# Patient Record
Sex: FEMALE | Race: BLACK OR AFRICAN AMERICAN | Employment: UNEMPLOYED | URBAN - METROPOLITAN AREA
[De-identification: names, ages, dates, MRNs, and addresses within clinical notes are randomized per-mention and may not be internally consistent; named-entity substitution may affect disease eponyms.]

---

## 2017-08-21 ENCOUNTER — OFFICE VISIT (OUTPATIENT)
Dept: FAMILY MEDICINE CLINIC | Age: 27
End: 2017-08-21
Payer: COMMERCIAL

## 2017-08-21 VITALS
SYSTOLIC BLOOD PRESSURE: 91 MMHG | DIASTOLIC BLOOD PRESSURE: 70 MMHG | TEMPERATURE: 97.3 F | HEART RATE: 91 BPM | BODY MASS INDEX: 19.84 KG/M2 | HEIGHT: 62 IN | WEIGHT: 107.8 LBS

## 2017-08-21 DIAGNOSIS — F79 MENTAL RETARDATION: ICD-10-CM

## 2017-08-21 DIAGNOSIS — J30.2 SEASONAL ALLERGIC RHINITIS, UNSPECIFIED ALLERGIC RHINITIS TRIGGER: ICD-10-CM

## 2017-08-21 DIAGNOSIS — F84.0 AUTISM: ICD-10-CM

## 2017-08-21 DIAGNOSIS — F90.1 ATTENTION-DEFICIT HYPERACTIVITY DISORDER, PREDOMINANTLY HYPERACTIVE TYPE: Primary | ICD-10-CM

## 2017-08-21 PROCEDURE — 99213 OFFICE O/P EST LOW 20 MIN: CPT | Performed by: FAMILY MEDICINE

## 2017-08-21 PROCEDURE — G8427 DOCREV CUR MEDS BY ELIG CLIN: HCPCS | Performed by: FAMILY MEDICINE

## 2017-08-21 PROCEDURE — 1036F TOBACCO NON-USER: CPT | Performed by: FAMILY MEDICINE

## 2017-08-21 PROCEDURE — G8420 CALC BMI NORM PARAMETERS: HCPCS | Performed by: FAMILY MEDICINE

## 2017-08-21 ASSESSMENT — ENCOUNTER SYMPTOMS
RESPIRATORY NEGATIVE: 1
ALLERGIC/IMMUNOLOGIC NEGATIVE: 1
EYES NEGATIVE: 1

## 2017-09-21 ENCOUNTER — TELEPHONE (OUTPATIENT)
Dept: FAMILY MEDICINE CLINIC | Age: 27
End: 2017-09-21

## 2017-09-27 ENCOUNTER — TELEPHONE (OUTPATIENT)
Dept: FAMILY MEDICINE CLINIC | Age: 27
End: 2017-09-27

## 2017-12-13 ENCOUNTER — TELEPHONE (OUTPATIENT)
Dept: FAMILY MEDICINE CLINIC | Age: 27
End: 2017-12-13

## 2017-12-13 NOTE — TELEPHONE ENCOUNTER
We received an envelope for pt from [facility]. Per  pt needs to come in for an appt before he can fill out papers. I called number in pt chart and pt father because he filled forms out but i got voicemail so I told them that they needed to call the office to schedule an appt with us in order to have forms filled out. Envelope will be in brown folder.

## 2017-12-27 ENCOUNTER — OFFICE VISIT (OUTPATIENT)
Dept: FAMILY MEDICINE CLINIC | Age: 27
End: 2017-12-27
Payer: COMMERCIAL

## 2017-12-27 VITALS
SYSTOLIC BLOOD PRESSURE: 102 MMHG | HEIGHT: 62 IN | WEIGHT: 113 LBS | HEART RATE: 90 BPM | DIASTOLIC BLOOD PRESSURE: 78 MMHG | TEMPERATURE: 97.1 F | BODY MASS INDEX: 20.8 KG/M2

## 2017-12-27 DIAGNOSIS — F90.1 ATTENTION-DEFICIT HYPERACTIVITY DISORDER, PREDOMINANTLY HYPERACTIVE TYPE: ICD-10-CM

## 2017-12-27 DIAGNOSIS — Z00.00 ANNUAL PHYSICAL EXAM: Primary | ICD-10-CM

## 2017-12-27 DIAGNOSIS — F84.0 AUTISM: ICD-10-CM

## 2017-12-27 DIAGNOSIS — F72 MR (MENTAL RETARDATION), SEVERE: ICD-10-CM

## 2017-12-27 DIAGNOSIS — H61.23 BILATERAL IMPACTED CERUMEN: ICD-10-CM

## 2017-12-27 PROCEDURE — 99395 PREV VISIT EST AGE 18-39: CPT | Performed by: FAMILY MEDICINE

## 2017-12-27 ASSESSMENT — ENCOUNTER SYMPTOMS
SHORTNESS OF BREATH: 0
ABDOMINAL PAIN: 0

## 2017-12-27 NOTE — PROGRESS NOTES
Visit Information    Have you changed or started any medications since your last visit including any over-the-counter medicines, vitamins, or herbal medicines? no   Have you stopped taking any of your medications? Is so, why? -  no  Are you having any side effects from any of your medications? - no    Have you seen any other physician or provider since your last visit?  no   Have you had any other diagnostic tests since your last visit?  no   Have you been seen in the emergency room and/or had an admission in a hospital since we last saw you?  no   Have you had your routine dental cleaning in the past 6 months?  no     Do you have an active MyChart account? If no, what is the barrier? Yes    Patient Care Team:  Lazaro Swenson MD as PCP - General (Family Medicine)    Medical History Review  Past Medical, Family, and Social History reviewed and does contribute to the patient presenting condition    Health Maintenance   Topic Date Due    DTaP/Tdap/Td vaccine (1 - Tdap) 12/27/2010    Flu vaccine (1) 08/21/2018 (Originally 9/1/2017)    Cervical cancer screen  08/21/2020 (Originally 12/27/2012)    HIV screen  08/21/2020 (Originally 12/27/2006)       . lab

## 2017-12-27 NOTE — PROGRESS NOTES
Subjective:      Patient ID: Lisset Rawls is a 33 y.o. female. HPI  Patient here with parents for a physical exam.  Due to her medical diagnoses as listed below, patient requires assistance with her daily activities. Parents deny any recent changes or new concerns. ROS responses by parents due to patient's MR  Review of Systems   Constitutional: Negative for chills and fever. HENT: Negative for congestion. Eyes: Negative for visual disturbance. Respiratory: Negative for shortness of breath. Cardiovascular: Negative for chest pain. Gastrointestinal: Negative for abdominal pain. Neurological: Negative for headaches. Objective:   Physical Exam   Constitutional: She appears well-nourished. HENT:   Head: Normocephalic and atraumatic. B/L cerumen impaction   Eyes: EOM are normal.   Neck: Neck supple. Cardiovascular: Normal rate, regular rhythm and normal heart sounds. Pulmonary/Chest: Effort normal and breath sounds normal. No respiratory distress. She has no wheezes. Abdominal: Soft. Bowel sounds are normal. She exhibits no distension. There is no tenderness. Musculoskeletal: She exhibits no edema or tenderness. Assessment:      1. Annual physical exam    2. MR (mental retardation), severe    3. Autism    4. Attention-deficit hyperactivity disorder, predominantly hyperactive type    5. Bilateral impacted cerumen          Plan:      1. Annual physical exam     2. MR (mental retardation), severe  stable   3. Autism  stable   4. Attention-deficit hyperactivity disorder, predominantly hyperactive type  Stable, presently off medications   5. Bilateral impacted cerumen  carbamide peroxide (DEBROX) 6.5 % otic solution     Forms completed - see media.

## 2018-01-12 ENCOUNTER — TELEPHONE (OUTPATIENT)
Dept: FAMILY MEDICINE CLINIC | Age: 28
End: 2018-01-12

## 2018-08-06 ENCOUNTER — OFFICE VISIT (OUTPATIENT)
Dept: FAMILY MEDICINE CLINIC | Age: 28
End: 2018-08-06
Payer: COMMERCIAL

## 2018-08-06 VITALS
HEIGHT: 62 IN | DIASTOLIC BLOOD PRESSURE: 70 MMHG | WEIGHT: 129.6 LBS | SYSTOLIC BLOOD PRESSURE: 99 MMHG | BODY MASS INDEX: 23.85 KG/M2 | TEMPERATURE: 98.7 F | HEART RATE: 101 BPM

## 2018-08-06 DIAGNOSIS — G40.109 PARTIAL EPILEPSY (HCC): ICD-10-CM

## 2018-08-06 DIAGNOSIS — Z00.00 ANNUAL PHYSICAL EXAM: Primary | ICD-10-CM

## 2018-08-06 DIAGNOSIS — F79 MENTAL RETARDATION: ICD-10-CM

## 2018-08-06 DIAGNOSIS — F90.1 ATTENTION-DEFICIT HYPERACTIVITY DISORDER, PREDOMINANTLY HYPERACTIVE TYPE: ICD-10-CM

## 2018-08-06 PROCEDURE — 99395 PREV VISIT EST AGE 18-39: CPT | Performed by: FAMILY MEDICINE

## 2018-08-06 PROCEDURE — 99213 OFFICE O/P EST LOW 20 MIN: CPT | Performed by: FAMILY MEDICINE

## 2018-08-06 ASSESSMENT — PATIENT HEALTH QUESTIONNAIRE - PHQ9
SUM OF ALL RESPONSES TO PHQ9 QUESTIONS 1 & 2: 0
SUM OF ALL RESPONSES TO PHQ QUESTIONS 1-9: 0
1. LITTLE INTEREST OR PLEASURE IN DOING THINGS: 0
2. FEELING DOWN, DEPRESSED OR HOPELESS: 0

## 2018-08-06 ASSESSMENT — ENCOUNTER SYMPTOMS
RESPIRATORY NEGATIVE: 1
EYES NEGATIVE: 1
GASTROINTESTINAL NEGATIVE: 1
ALLERGIC/IMMUNOLOGIC NEGATIVE: 1

## 2018-08-06 NOTE — PROGRESS NOTES
Subjective:      Patient ID: Miguel Wilalrd is a 33 y.o. female. here for an annual physical exam.has a prior history of ADHD/mental retardation but are not on any medication. HPI    Review of Systems   Constitutional: Negative. HENT: Negative. Eyes: Negative. Respiratory: Negative. Cardiovascular: Negative. Gastrointestinal: Negative. Endocrine: Negative. Genitourinary: Negative. Allergic/Immunologic: Negative. Neurological: Negative. Hematological: Negative. Psychiatric/Behavioral: Negative. Objective:   Physical Exam   Constitutional: She appears well-developed and well-nourished. No distress. Cardiovascular: Normal rate, regular rhythm, normal heart sounds and intact distal pulses. Exam reveals no gallop and no friction rub. No murmur heard. Pulmonary/Chest: Effort normal and breath sounds normal. No respiratory distress. She has no wheezes. She has no rales. She exhibits no tenderness. Abdominal: Soft. Bowel sounds are normal. She exhibits no distension and no mass. There is no tenderness. There is no rebound and no guarding. Neurological: She is alert. Skin: She is not diaphoretic. Nursing note and vitals reviewed. Assessment:    annual physical exam.  ADHd. History of seizure disorder. .        Plan:    have mental retardation and ADHD and seizure disorder but are not on any medication and stable.   Not sexually active and foster mom does not want Pap test.

## 2018-08-06 NOTE — PROGRESS NOTES
Visit Information    Have you changed or started any medications since your last visit including any over-the-counter medicines, vitamins, or herbal medicines? no   Have you stopped taking any of your medications? Is so, why? -  no  Are you having any side effects from any of your medications? - no    Have you seen any other physician or provider since your last visit?  no   Have you had any other diagnostic tests since your last visit?  no   Have you been seen in the emergency room and/or had an admission in a hospital since we last saw you?  no   Have you had your routine dental cleaning in the past 6 months?  no     Do you have an active MyChart account? If no, what is the barrier?   No: declined    Patient Care Team:  Balbina Vides MD as PCP - General (Family Medicine)    Medical History Review  Past Medical, Family, and Social History reviewed and does not contribute to the patient presenting condition    Health Maintenance   Topic Date Due    DTaP/Tdap/Td vaccine (1 - Tdap) 12/27/2010    Cervical cancer screen  08/21/2020 (Originally 12/27/2012)    HIV screen  08/21/2020 (Originally 12/27/2006)    Flu vaccine (1) 09/01/2018

## 2018-08-17 ENCOUNTER — TELEPHONE (OUTPATIENT)
Dept: ADMINISTRATIVE | Age: 28
End: 2018-08-17

## 2018-09-25 ENCOUNTER — OFFICE VISIT (OUTPATIENT)
Dept: FAMILY MEDICINE CLINIC | Age: 28
End: 2018-09-25
Payer: COMMERCIAL

## 2018-09-25 VITALS
WEIGHT: 128 LBS | HEART RATE: 104 BPM | HEIGHT: 62 IN | BODY MASS INDEX: 23.55 KG/M2 | TEMPERATURE: 97.9 F | DIASTOLIC BLOOD PRESSURE: 72 MMHG | SYSTOLIC BLOOD PRESSURE: 109 MMHG

## 2018-09-25 DIAGNOSIS — B86 SCABIES: Primary | ICD-10-CM

## 2018-09-25 PROCEDURE — 1036F TOBACCO NON-USER: CPT | Performed by: STUDENT IN AN ORGANIZED HEALTH CARE EDUCATION/TRAINING PROGRAM

## 2018-09-25 PROCEDURE — 99213 OFFICE O/P EST LOW 20 MIN: CPT | Performed by: STUDENT IN AN ORGANIZED HEALTH CARE EDUCATION/TRAINING PROGRAM

## 2018-09-25 PROCEDURE — G8420 CALC BMI NORM PARAMETERS: HCPCS | Performed by: STUDENT IN AN ORGANIZED HEALTH CARE EDUCATION/TRAINING PROGRAM

## 2018-09-25 PROCEDURE — G8427 DOCREV CUR MEDS BY ELIG CLIN: HCPCS | Performed by: STUDENT IN AN ORGANIZED HEALTH CARE EDUCATION/TRAINING PROGRAM

## 2018-09-25 RX ORDER — PERMETHRIN 50 MG/G
CREAM TOPICAL
Qty: 1 TUBE | Refills: 0 | Status: SHIPPED | OUTPATIENT
Start: 2018-09-25 | End: 2018-10-22 | Stop reason: SDUPTHER

## 2018-09-25 ASSESSMENT — ENCOUNTER SYMPTOMS
GASTROINTESTINAL NEGATIVE: 1
EYES NEGATIVE: 1
RESPIRATORY NEGATIVE: 1

## 2018-09-25 NOTE — PROGRESS NOTES
2018     Darell York (:  1990) is a 33 y.o. female, here for evaluation of the following medical concerns:    HPI  {Visit Chronic CHP (Optional):27550}    Review of Systems    Prior to Visit Medications    Medication Sig Taking? Authorizing Provider   permethrin (ELIMITE) 5 % cream Massage on to skin from head to toe, avoid eyes and mouth. Leave on for 12 hours and then wash off. Yes Rl Escobar MD   diphenhydrAMINE-zinc acetate (BENADRYL ITCH STOPPING) 1-0.1 % cream Apply topically 3 times daily as needed. Yes Rl Escobar MD   Loratadine 10 MG CAPS Take 10 mg by mouth daily Yes Kiki Masters MD   methylphenidate (RITALIN) 10 MG tablet Take 1 tablet by mouth 4 times daily  Kiki Masters MD   levETIRAcetam (KEPPRA) 250 MG tablet Take 1 tablet in the morning and 2 tablets at night. Kiki Masters MD        Social History   Substance Use Topics    Smoking status: Never Smoker    Smokeless tobacco: Never Used    Alcohol use No        Vitals:    18 0850   BP: 109/72   Site: Right Upper Arm   Position: Sitting   Cuff Size: Medium Adult   Pulse: 104   Temp: 97.9 °F (36.6 °C)   TempSrc: Temporal   Weight: 128 lb (58.1 kg)   Height: 5' 2.01\" (1.575 m)     Estimated body mass index is 23.41 kg/m² as calculated from the following:    Height as of this encounter: 5' 2.01\" (1.575 m). Weight as of this encounter: 128 lb (58.1 kg). Physical Exam    ASSESSMENT/PLAN:  1. Scabies    - permethrin (ELIMITE) 5 % cream; Massage on to skin from head to toe, avoid eyes and mouth. Leave on for 12 hours and then wash off. Dispense: 1 Tube; Refill: 0  - diphenhydrAMINE-zinc acetate (BENADRYL ITCH STOPPING) 1-0.1 % cream; Apply topically 3 times daily as needed. Dispense: 1 Tube; Refill: 0      Return in about 4 weeks (around 10/23/2018) for rash follow-up . An electronic signature was used to authenticate this note.     --Brandi Acosta MD on 2018 at 2:19 PM

## 2018-09-25 NOTE — PROGRESS NOTES
stages of healing         Lab Results   Component Value Date    WBC 5.7 05/18/2012    HGB 13.1 05/18/2012    HCT 40.1 05/18/2012     05/18/2012    ALT 13 05/18/2012    AST 22 05/18/2012     05/18/2012    K 3.5 05/18/2012     05/18/2012    CREATININE 0.56 05/18/2012    BUN 12 05/18/2012    CO2 27 05/18/2012     Lab Results   Component Value Date    CALCIUM 9.3 05/18/2012     No results found for: LDLCALC, LDLCHOLESTEROL, LDLDIRECT    Assessment and Plan:    1. Scabies  - permethrin (ELIMITE) 5 % cream; Massage on to skin from head to toe, avoid eyes and mouth. Leave on for 12 hours and then wash off. Dispense: 1 Tube; Refill: 0  - diphenhydrAMINE-zinc acetate (BENADRYL ITCH STOPPING) 1-0.1 % cream; Apply topically 3 times daily as needed. Dispense: 1 Tube; Refill: 0    2. Need for Vaccination  - mother refused influenza  - will return for Tdap          Requested Prescriptions     Signed Prescriptions Disp Refills    permethrin (ELIMITE) 5 % cream 1 Tube 0     Sig: Massage on to skin from head to toe, avoid eyes and mouth. Leave on for 12 hours and then wash off.  diphenhydrAMINE-zinc acetate (BENADRYL ITCH STOPPING) 1-0.1 % cream 1 Tube 0     Sig: Apply topically 3 times daily as needed. There are no discontinued medications. No Follow-up on file. Guerita received counseling on the following healthy behaviors: medication adherence  Reviewed prior labs and health maintenance  Continue current medications, diet and exercise. Discussed use, benefit, and side effects of prescribed medications. Barriers to medication compliance addressed. Patient given educational materials - see patient instructions  Was a self-tracking handout given in paper form or via [employer]? No    Requested Prescriptions     Signed Prescriptions Disp Refills    permethrin (ELIMITE) 5 % cream 1 Tube 0     Sig: Massage on to skin from head to toe, avoid eyes and mouth.  Leave on for 12 hours and then wash off.    diphenhydrAMINE-zinc acetate (BENADRYL ITCH STOPPING) 1-0.1 % cream 1 Tube 0     Sig: Apply topically 3 times daily as needed. All patient questions answered. Patient voiced understanding. Quality Measures    Body mass index is 23.41 kg/m². Normal. Weight control planned discussed Healthy diet and regular exercise. BP: 109/72 Blood pressure is normal. Treatment plan consists of No treatment change needed.     No results found for: LDLCALC, LDLCHOLESTEROL, LDLDIRECT (goal LDL reduction with dx if diabetes is 50% LDL reduction)      PHQ Scores 8/6/2018   PHQ2 Score 0   PHQ9 Score 0     Interpretation of Total Score Depression Severity: 1-4 = Minimal depression, 5-9 = Mild depression, 10-14 = Moderate depression, 15-19 = Moderately severe depression, 20-27 = Severe depression

## 2018-10-08 ENCOUNTER — TELEPHONE (OUTPATIENT)
Dept: FAMILY MEDICINE CLINIC | Age: 28
End: 2018-10-08

## 2018-10-10 ENCOUNTER — TELEPHONE (OUTPATIENT)
Dept: FAMILY MEDICINE CLINIC | Age: 28
End: 2018-10-10

## 2018-10-10 NOTE — TELEPHONE ENCOUNTER
The mother of Mark Silver called stating that the medication for her daughter is not working for her rash on her arm and leg, can you call in new medication to her pharmacy because the mother do not want to bring the girls back in office.

## 2018-10-22 ENCOUNTER — OFFICE VISIT (OUTPATIENT)
Dept: FAMILY MEDICINE CLINIC | Age: 28
End: 2018-10-22
Payer: COMMERCIAL

## 2018-10-22 VITALS
SYSTOLIC BLOOD PRESSURE: 120 MMHG | DIASTOLIC BLOOD PRESSURE: 70 MMHG | HEIGHT: 62 IN | BODY MASS INDEX: 24.4 KG/M2 | WEIGHT: 132.6 LBS

## 2018-10-22 DIAGNOSIS — B86 SCABIES: ICD-10-CM

## 2018-10-22 PROCEDURE — G8420 CALC BMI NORM PARAMETERS: HCPCS | Performed by: STUDENT IN AN ORGANIZED HEALTH CARE EDUCATION/TRAINING PROGRAM

## 2018-10-22 PROCEDURE — 99213 OFFICE O/P EST LOW 20 MIN: CPT | Performed by: STUDENT IN AN ORGANIZED HEALTH CARE EDUCATION/TRAINING PROGRAM

## 2018-10-22 PROCEDURE — G8427 DOCREV CUR MEDS BY ELIG CLIN: HCPCS | Performed by: STUDENT IN AN ORGANIZED HEALTH CARE EDUCATION/TRAINING PROGRAM

## 2018-10-22 PROCEDURE — G8484 FLU IMMUNIZE NO ADMIN: HCPCS | Performed by: STUDENT IN AN ORGANIZED HEALTH CARE EDUCATION/TRAINING PROGRAM

## 2018-10-22 PROCEDURE — 1036F TOBACCO NON-USER: CPT | Performed by: STUDENT IN AN ORGANIZED HEALTH CARE EDUCATION/TRAINING PROGRAM

## 2018-10-22 RX ORDER — PERMETHRIN 50 MG/G
CREAM TOPICAL
Qty: 1 TUBE | Refills: 1 | Status: SHIPPED | OUTPATIENT
Start: 2018-10-22 | End: 2018-11-27 | Stop reason: SDUPTHER

## 2018-10-22 RX ORDER — TRIAMCINOLONE ACETONIDE 0.25 MG/G
OINTMENT TOPICAL
Qty: 1 TUBE | Refills: 1 | Status: SHIPPED | OUTPATIENT
Start: 2018-10-22 | End: 2018-10-29

## 2018-10-22 ASSESSMENT — ENCOUNTER SYMPTOMS
ROS SKIN COMMENTS: PRURITIS
NAUSEA: 0
SHORTNESS OF BREATH: 0

## 2018-10-22 NOTE — PATIENT INSTRUCTIONS
Thank you for letting us take care of you today. We hope all your questions were addressed. If a question was overlooked or something else comes to mind after you return home, please contact a member of your Care Team listed below. Please make sure you have a routine office visit set up to follow-up on [address]. Your Care Team at [place] [address] is Team #3  Barton Lesches, MD (Faculty)  Rach Barry MD (Faculty  Dub MD Teodoro (Resident)  Marylen Blunt, MD (Resident)   Tristen Lim MD (Resident)  Suyapa Dixon MD (Resident)  Radha Horne MD (Resident)  Ning Sanders LPN  [facility] [address] [facility] [place] Chikis Wilder ([facility])  Alexandra Baxter RN, ([address] [facility])  Shaquille Buck, Ph.D., (Behavioral Services)  [address] (Clinical Pharmacist)     Office phone number: [phone]    If you need to get in right away due to illness, please be advised we have \"Same Day\" appointments available Monday-Friday. Please call us at [phone] option #3 to schedule your \"Same Day\" appointment.

## 2018-10-22 NOTE — PROGRESS NOTES
Subjective:      Karly Mathew is a 33 y.o. female with Hx of  has a past medical history of ADHD (attention deficit hyperactivity disorder); Autism; and Seizure ([address] HPI    Ms. Chano Devlin is a 34 yo female presenting to the office with caregiver and sister for rash throughout body. Patient was seen about a month ago for similar complaints. Patient was prescribed Permethrin cream without relief. Caregivers states rash has persisted but not worsening. States she does itch. Per caregiver, patient attends [address] [place] [address] and may have been exposed to bed bugs or other infection. Review of Systems   Constitutional: Negative for activity change and fever. Respiratory: Negative for shortness of breath. Gastrointestinal: Negative for nausea. Skin: Positive for rash. pruritis    Neurological: Negative for headaches. No family history on file. Social History     Social History    Marital status: Single     Spouse name: N/A    Number of children: N/A    Years of education: N/A     Occupational History    Not on file. Social History Main Topics    Smoking status: Never Smoker    Smokeless tobacco: Never Used    Alcohol use No    Drug use: No    Sexual activity: No     Other Topics Concern    Not on file     Social History Narrative    No narrative on file       Objective:      /70 (Site: Left Upper Arm, Position: Sitting, Cuff Size: Medium Adult) Comment: manual  Ht 5' 2.01\" (1.575 m)   Wt 132 lb 9.6 oz (60.1 kg)   BMI 24.25 kg/m²    BP Readings from Last 3 Encounters:   10/22/18 120/70   09/25/18 109/72   08/06/18 99/70       Physical Exam    General Appearance:  A&Ox3, NAD  Lungs:  Clear to auscultation B/L. Cardiovascular:  NL S1/S2, RRR, no m,g,r. Abdomen: + BS, Soft, nontender, nondistended, no masses or organomegaly  Skin: multiple erythematous lesions on body   Extremities: No cyanosis, clubbing or edema    Assessment:     1.  Scabies        Plan:

## 2018-11-26 ENCOUNTER — TELEPHONE (OUTPATIENT)
Dept: ADMINISTRATIVE | Age: 28
End: 2018-11-26

## 2018-11-26 NOTE — TELEPHONE ENCOUNTER
pts mother is calling needing a refill on the rash medication cream, any questions call pt mother at phone number [phone]

## 2018-11-27 DIAGNOSIS — L23.9 ALLERGIC DERMATITIS: Primary | ICD-10-CM

## 2018-11-27 DIAGNOSIS — B86 SCABIES: ICD-10-CM

## 2018-11-27 RX ORDER — PERMETHRIN 50 MG/G
CREAM TOPICAL
Qty: 1 TUBE | Refills: 1 | Status: SHIPPED | OUTPATIENT
Start: 2018-11-27 | End: 2019-10-09

## 2018-11-27 RX ORDER — TRIAMCINOLONE ACETONIDE 0.25 MG/G
OINTMENT TOPICAL
Qty: 1 TUBE | Refills: 1 | Status: CANCELLED | OUTPATIENT
Start: 2018-11-27 | End: 2018-12-04

## 2018-11-27 RX ORDER — PERMETHRIN 50 MG/G
CREAM TOPICAL
Qty: 1 TUBE | Refills: 1 | Status: CANCELLED | OUTPATIENT
Start: 2018-11-27

## 2019-03-01 ENCOUNTER — OFFICE VISIT (OUTPATIENT)
Dept: FAMILY MEDICINE CLINIC | Age: 29
End: 2019-03-01
Payer: COMMERCIAL

## 2019-03-01 VITALS
DIASTOLIC BLOOD PRESSURE: 83 MMHG | SYSTOLIC BLOOD PRESSURE: 125 MMHG | TEMPERATURE: 97.5 F | BODY MASS INDEX: 26.87 KG/M2 | HEIGHT: 62 IN | WEIGHT: 146 LBS
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
## 2019-03-01 PROCEDURE — 99213 OFFICE O/P EST LOW 20 MIN: CPT | Performed by: STUDENT IN AN ORGANIZED HEALTH CARE EDUCATION/TRAINING PROGRAM

## 2019-03-01 PROCEDURE — 90715 TDAP VACCINE 7 YRS/> IM: CPT | Performed by: FAMILY MEDICINE

## 2019-03-01 PROCEDURE — 99211 OFF/OP EST MAY X REQ PHY/QHP: CPT | Performed by: STUDENT IN AN ORGANIZED HEALTH CARE EDUCATION/TRAINING PROGRAM

## 2019-03-01 RX ORDER — TRIAMCINOLONE ACETONIDE 0.25 MG/G
CREAM TOPICAL
Qty: 1 TUBE | Refills: 3 | Status: SHIPPED | OUTPATIENT
Start: 2019-03-01 | End: 2019-10-09

## 2019-03-01 ASSESSMENT — PATIENT HEALTH QUESTIONNAIRE - PHQ9
SUM OF ALL RESPONSES TO PHQ9 QUESTIONS 1 & 2: 0
SUM OF ALL RESPONSES TO PHQ QUESTIONS 1-9: 0
2. FEELING DOWN, DEPRESSED OR HOPELESS: 0
SUM OF ALL RESPONSES TO PHQ QUESTIONS 1-9: 0
1. LITTLE INTEREST OR PLEASURE IN DOING THINGS: 0

## 2019-06-14 ENCOUNTER — TELEPHONE (OUTPATIENT)
Dept: FAMILY MEDICINE CLINIC | Age: 29
End: 2019-06-14

## 2019-06-17 DIAGNOSIS — R21 MACULOPAPULAR RASH, GENERALIZED: Primary | ICD-10-CM

## 2019-07-01 ENCOUNTER — TELEPHONE (OUTPATIENT)
Dept: FAMILY MEDICINE CLINIC | Age: 29
End: 2019-07-01

## 2019-07-12 ENCOUNTER — OFFICE VISIT (OUTPATIENT)
Dept: DERMATOLOGY | Age: 29
End: 2019-07-12
Payer: COMMERCIAL

## 2019-07-12 VITALS — BODY MASS INDEX: 29 KG/M2 | HEIGHT: 62 IN | WEIGHT: 157.6 LBS

## 2019-07-12 DIAGNOSIS — L30.8 OTHER SPECIFIED DERMATITIS: ICD-10-CM

## 2019-07-12 DIAGNOSIS — B86 SCABIES: Primary | ICD-10-CM

## 2019-07-12 DIAGNOSIS — J30.2 SEASONAL ALLERGIES: ICD-10-CM

## 2019-07-12 PROCEDURE — 99203 OFFICE O/P NEW LOW 30 MIN: CPT | Performed by: DERMATOLOGY

## 2019-07-12 RX ORDER — LORATADINE 10 MG/1
10 CAPSULE, LIQUID FILLED ORAL DAILY
Qty: 30 CAPSULE | Refills: 5 | Status: SHIPPED | OUTPATIENT
Start: 2019-07-12

## 2019-07-12 RX ORDER — IVERMECTIN 3 MG/1
200 TABLET ORAL ONCE
Qty: 5 TABLET | Refills: 0 | Status: SHIPPED | OUTPATIENT
Start: 2019-07-12 | End: 2019-07-12

## 2019-07-12 NOTE — PROGRESS NOTES
Dermatology Patient Note  [facility] DERMATOLOGY  [address] [facility]  [address] [place] [address]  Dept: [phone]  Dept Fax: [phone]      VISITDATE: 7/12/2019   REFERRING PROVIDER: Latasha Sharp MD      Maria Del Carmen Ramírez is a 33 y.o. female  who presents today in the office for:    New Patient (Rash all over the body and was treated for scabies with permethrin and triamcinolone)      HISTORY OF PRESENT ILLNESS:  33 y.o. female with h/o autism, MR, and ADHD presenting for bumps and itching  Location: hands and feet especially, but diffuse  Duration: on and off for nearly a year  Symptoms: itching  Course: Began in September for both her and her twin sister, treated for scabies but recurred, went back to doc in October and again in March. Both girls spend some of their time at respite care and mom thinks they may be getting reinfected there. They were recently sent home from school for repeat itching, especially Shavette. In addition to completing scabies treatment and environmental control, mom has also had an  come out for bed bugs and has \"bombed\" the house. Exacerbating factors: potentially exposure at adult respite care  Prior treatments: permethrin, triamcinolone 0.025% cream, claritin,  at home      CURRENT MEDICATIONS:   Current Outpatient Medications   Medication Sig Dispense Refill    fluocinonide (LIDEX) 0.05 % cream Apply topically 2 times daily. 60 g 2    loratadine (CLARITIN) 10 MG capsule Take 1 capsule by mouth daily 30 capsule 5    levETIRAcetam (KEPPRA) 250 MG tablet Take 1 tablet in the morning and 2 tablets at night. 90 tablet 5    triamcinolone (KENALOG) 0.025 % cream Apply topically 2 times daily. 1 Tube 3    permethrin (ELIMITE) 5 % cream Massage on to skin from head to toe, avoid eyes and mouth. Leave on for 12 hours and then wash off. 1 Tube 1     No current facility-administered medications for this visit.         ALLERGIES:   Allergies

## 2019-07-12 NOTE — PATIENT INSTRUCTIONS
- Ivermectin orally  - Apply fluocinonide cream twice daily for active itch (DO NOT APPLY TO FACE)  - Claritin every morning before school

## 2019-09-11 ENCOUNTER — TELEPHONE (OUTPATIENT)
Dept: FAMILY MEDICINE CLINIC | Age: 29
End: 2019-09-11

## 2019-10-09 ENCOUNTER — OFFICE VISIT (OUTPATIENT)
Dept: FAMILY MEDICINE CLINIC | Age: 29
End: 2019-10-09
Payer: COMMERCIAL

## 2019-10-09 VITALS
BODY MASS INDEX: 28.86 KG/M2 | SYSTOLIC BLOOD PRESSURE: 100 MMHG | HEART RATE: 70 BPM | WEIGHT: 157.8 LBS | DIASTOLIC BLOOD PRESSURE: 65 MMHG | TEMPERATURE: 98.2 F

## 2019-10-09 DIAGNOSIS — L30.8 OTHER SPECIFIED DERMATITIS: ICD-10-CM

## 2019-10-09 DIAGNOSIS — Z00.00 ANNUAL PHYSICAL EXAM: Primary | ICD-10-CM

## 2019-10-09 PROCEDURE — 99211 OFF/OP EST MAY X REQ PHY/QHP: CPT | Performed by: FAMILY MEDICINE

## 2019-10-09 PROCEDURE — 99395 PREV VISIT EST AGE 18-39: CPT | Performed by: FAMILY MEDICINE

## 2019-10-09 ASSESSMENT — ENCOUNTER SYMPTOMS
RESPIRATORY NEGATIVE: 1
EYES NEGATIVE: 1
ALLERGIC/IMMUNOLOGIC NEGATIVE: 1
GASTROINTESTINAL NEGATIVE: 1

## 2020-07-10 ENCOUNTER — TELEPHONE (OUTPATIENT)
Dept: FAMILY MEDICINE CLINIC | Age: 30
End: 2020-07-10

## 2020-07-10 NOTE — TELEPHONE ENCOUNTER
Brad Rios from [employer] called in wanting information on pt and left her number to get a call back [phone]

## 2023-04-06 ENCOUNTER — HOSPITAL ENCOUNTER (EMERGENCY)
Facility: CLINIC | Age: 33
Discharge: HOME | End: 2023-04-06
Attending: EMERGENCY MEDICINE
Payer: COMMERCIAL

## 2023-04-06 ENCOUNTER — APPOINTMENT (OUTPATIENT)
Dept: GENERAL RADIOLOGY | Facility: CLINIC | Age: 33
End: 2023-04-06
Payer: COMMERCIAL

## 2023-04-06 VITALS
TEMPERATURE: 98.3 F | RESPIRATION RATE: 16 BRPM | OXYGEN SATURATION: 100 % | SYSTOLIC BLOOD PRESSURE: 107 MMHG | DIASTOLIC BLOOD PRESSURE: 76 MMHG | HEIGHT: 62 IN | WEIGHT: 120 LBS | HEART RATE: 88 BPM | BODY MASS INDEX: 22.08 KG/M2

## 2023-04-06 DIAGNOSIS — S52.521A CLOSED METAPHYSEAL TORUS FRACTURE OF DISTAL END OF RIGHT RADIUS, INITIAL ENCOUNTER: Primary | ICD-10-CM

## 2023-04-06 PROCEDURE — 73110 X-RAY EXAM OF WRIST: CPT

## 2023-04-06 PROCEDURE — 6370000000 HC RX 637 (ALT 250 FOR IP): Performed by: PHYSICIAN ASSISTANT

## 2023-04-06 RX ORDER — ACETAMINOPHEN 160 MG/5ML
15 SUSPENSION ORAL EVERY 6 HOURS PRN
Qty: 240 ML | Refills: 0 | Status: SHIPPED | OUTPATIENT
Start: 2023-04-06

## 2023-04-06 RX ORDER — ACETAMINOPHEN 160 MG/5ML
15 SUSPENSION ORAL EVERY 6 HOURS PRN
Qty: 240 ML | Refills: 0 | Status: SHIPPED | OUTPATIENT
Start: 2023-04-06 | End: 2023-04-06 | Stop reason: SDUPTHER

## 2023-04-06 RX ADMIN — IBUPROFEN 600 MG: 100 SUSPENSION ORAL at 12:34

## 2023-04-06 ASSESSMENT — PAIN SCALES - GENERAL: PAINLEVEL_OUTOF10: 2

## 2023-04-06 NOTE — ED PROVIDER NOTES
[facility] ED  [address] [facility]  Phone: [phone]      Attending Physician [address]       Chief Complaint   Patient presents with    Wrist Pain       DIAGNOSTIC RESULTS     LABS:  Labs Reviewed - No data to display    All other labs were within normal range or not returned as of this dictation. RADIOLOGY:  XR WRIST RIGHT (MIN 3 VIEWS)    (Results Pending)         EMERGENCY DEPARTMENT COURSE:   Vitals:    Vitals:    04/06/23 1227   BP: 107/76   Pulse: 88   Resp: 16   Temp: 98.3 °F (36.8 °C)   TempSrc: Temporal   SpO2: 100%   Weight: 54.4 kg (120 lb)   Height: 5' 2\" (1.575 m)     -------------------------  BP: 107/76, Temp: 98.3 °F (36.8 °C), Heart Rate: 88, Resp: 16             PERTINENT ATTENDING PHYSICIAN COMMENTS:    I performed a history and physical examination of the patient and discussed management with the mid level provider. I reviewed the mid level provider's note and agree with the documented findings and plan of care. Any areas of disagreement are noted on the chart. I was personally present for the key portions of any procedures. I have documented in the chart those procedures where I was not present during the key portions. I have reviewed the emergency nurses triage note. I agree with the chief complaint, past medical history, past surgical history, allergies, medications, social and family history as documented unless otherwise noted below. Documentation of the HPI, Physical Exam and Medical Decision Making performed by mid level providers is based on my personal performance of the HPI, PE and MDM. For Physician Assistant/ Nurse Practitioner cases/documentation I have personally evaluated this patient and have completed at least one if not all key elements of the E/M (history, physical exam, and MDM). Additional findings are as noted.           (Please note that portions of this note were completed with a voice recognition program.
Scapholunate space is at the upper limits of normal.  Bone mineralization and alignment appear otherwise intact. No radiopaque soft tissue foreign bodies. Essentially nondisplaced distal radial metaphyseal fracture. LABS:  No results found for this visit on 04/06/23. EMERGENCY DEPARTMENT COURSE           Vitals:    Vitals:    04/06/23 1227   BP: 107/76   Pulse: 88   Resp: 16   Temp: 98.3 °F (36.8 °C)   TempSrc: Temporal   SpO2: 100%   Weight: 54.4 kg (120 lb)   Height: 5' 2\" (1.575 m)     -------------------------  BP: 107/76, Temp: 98.3 °F (36.8 °C), Heart Rate: 88, Resp: 16      RE-EVALUATION:  Patient's XR demonstrates a fracture. Patient and caregiver updated regarding these results. Discussed supportive care instructions for home. Placed in a splint as per the procedure note below. Schedule an Orth[facility] appointment. The patient and/or family and I have discussed the diagnosis and risks, and we agree with discharging home to follow-up with their PCP and/or pertinent providers. The patient appears stable for discharge and has been instructed to return immediately for new concerning symptoms like fever, increased pain, weakness, numbness, color change, or coldness to an extremity or if the current symptoms worsen in any way. The patient understands that at this time there is no evidence for a more malignant underlying process, but the patient also understands that early in the process of an illness or injury, an emergency department workup can be falsely reassuring. Routine discharge counseling was given, and the patient understands that worsening, changing or persistent symptoms should prompt an immediate call or follow up with their primary physician or return to the emergency department. I have reviewed the disposition diagnosis with the patient and or their family/guardian. I have answered their questions and given discharge instructions.  They voiced understanding of these

## 2023-04-06 NOTE — DISCHARGE INSTRUCTIONS
It is recommended that you keep your splint on until seen by the orthopedist. Letty Matthews not get it wet. Take ibuprofen and acetaminophen as prescribed and on a regular schedule for the next few days. You can take them together every 6 hours to help control your pain. Otherwise, utilize rest, ice for 20 minutes several times a day, and elevate as much as possible to minimize the pain and swelling. PLEASE RETURN TO THE EMERGENCY DEPARTMENT IMMEDIATELY if your symptoms worsen in anyway or in 1-2 days if not improved for re-evaluation. You should immediately return to the ER for symptoms such as new or worsening pain, fever, numbness or weakness to the arms or legs, coolness or color change of the arms or legs. Juancarlos Martinez!!!    From [facility] ([facility]) and [facility] Emergency Services    On behalf of the Emergency Department staff at [facility]), I would like to thank you for giving us the opportunity to address your health care needs and concerns. We hope that during your visit, our service was delivered in a professional and caring manner. Please keep [facility] ([facility]) in mind as we walk with you down the path to your own personal wellness. Please expect an automated text message or email from us so we can ask a few questions about your health and progress. Based on your answers, a clinician may call you back to offer help and instructions. Please understand that early in the process of an illness or injury, an emergency department workup can be falsely reassuring. If you notice any worsening, changing or persistent symptoms please call your family doctor or return to the ER immediately. Tell us how we did during your visit at [email or web address]   and let us know about your experience

## 2023-04-06 NOTE — ED NOTES
Patient brought to ED by caregiver from [facility]. Staff member reports patient has been favoring her right wrist/hand today. Patient had reportedly tripped and fell while getting out of vehicle earlier today.       Sadie Helton RN  04/06/23 2970

## 2023-04-07 ENCOUNTER — OFFICE VISIT (OUTPATIENT)
Dept: ORTHOPEDIC SURGERY | Age: 33
End: 2023-04-07

## 2023-04-07 VITALS — HEIGHT: 62 IN | WEIGHT: 120 LBS | RESPIRATION RATE: 16 BRPM | BODY MASS INDEX: 22.08 KG/M2

## 2023-04-07 DIAGNOSIS — S52.501A CLOSED FRACTURE OF DISTAL END OF RIGHT RADIUS, UNSPECIFIED FRACTURE MORPHOLOGY, INITIAL ENCOUNTER: Primary | ICD-10-CM

## 2023-04-09 ASSESSMENT — ENCOUNTER SYMPTOMS
SHORTNESS OF BREATH: 0
ABDOMINAL PAIN: 0
ROS SKIN COMMENTS: NEGATIVE FOR RASH
EYE DISCHARGE: 0

## 2023-04-09 NOTE — PROGRESS NOTES
Number of children: Not on file    Years of education: Not on file    Highest education level: Not on file   Occupational History    Not on file   Tobacco Use    Smoking status: Never    Smokeless tobacco: Never   Substance and Sexual Activity    Alcohol use: No    Drug use: No    Sexual activity: Never   Other Topics Concern    Not on file   Social History Narrative    Not on file     Social Determinants of Health     Financial Resource Strain: Not on file   Food Insecurity: Not on file   Transportation Needs: Not on file   Physical Activity: Not on file   Stress: Not on file   Social Connections: Not on file   Intimate Partner Violence: Not on file   Housing Stability: Not on file       Family History:  No family history on file. REVIEW OF SYSTEMS:  Review of Systems   Constitutional:  Positive for activity change. Negative for fever. HENT:  Negative for dental problem. Eyes:  Negative for discharge. Respiratory:  Negative for shortness of breath. Cardiovascular:  Negative for chest pain. Gastrointestinal:  Negative for abdominal pain. Genitourinary: Negative. Musculoskeletal:  Positive for arthralgias. Skin:         Negative for rash   Neurological:  Positive for weakness. Psychiatric/Behavioral:  Negative for confusion. I have reviewed the CC, HPI, ROS, PMH, FHX, Social History. I agree with the documentation provided by other staff, residents and havereviewed their documentation prior to providing my signature indicating agreement. PHYSICAL EXAM:  Resp 16   Ht 5' 2\" (1.575 m)   Wt 120 lb (54.4 kg)   BMI 21.95 kg/m²  Body mass index is 21.95 kg/m². Physical Exam  Gen: alert and oriented  Psych:  Appropriate affect; Appropriate knowledge base; Appropriate mood; No hallucinations; Head: normocephalic atraumatic   Chest: symmetric chest excursion  Pelvis: stable  Ortho Exam  Extremity: Mild swelling right wrist is appreciated.   Mild tenderness over the distal radius is

## 2023-05-15 ENCOUNTER — TELEPHONE (OUTPATIENT)
Dept: ORTHOPEDIC SURGERY | Age: 33
End: 2023-05-15

## 2023-05-15 NOTE — TELEPHONE ENCOUNTER
[facility] appt scheduled 5/24 at 1pm needs to be rescheduled to next available in [place] or [place].

## 2023-06-27 ENCOUNTER — HOSPITAL ENCOUNTER (EMERGENCY)
Facility: CLINIC | Age: 33
Discharge: HOME | End: 2023-06-27
Attending: EMERGENCY MEDICINE
Payer: MEDICARE

## 2023-06-27 VITALS
WEIGHT: 95 LBS | DIASTOLIC BLOOD PRESSURE: 81 MMHG | OXYGEN SATURATION: 98 % | BODY MASS INDEX: 17.38 KG/M2 | HEART RATE: 81 BPM | RESPIRATION RATE: 18 BRPM | SYSTOLIC BLOOD PRESSURE: 110 MMHG | TEMPERATURE: 98 F

## 2023-06-27 DIAGNOSIS — S52.501A CLOSED FRACTURE OF DISTAL END OF RIGHT RADIUS, UNSPECIFIED FRACTURE MORPHOLOGY, INITIAL ENCOUNTER: Primary | ICD-10-CM

## 2023-06-27 DIAGNOSIS — S01.81XA LACERATION OF FOREHEAD, INITIAL ENCOUNTER: Primary | ICD-10-CM

## 2023-06-27 PROCEDURE — 12011 RPR F/E/E/N/L/M 2.5 CM/<: CPT

## 2023-06-27 PROCEDURE — 6370000000 HC RX 637 (ALT 250 FOR IP): Performed by: EMERGENCY MEDICINE

## 2023-06-27 PROCEDURE — 99283 EMERGENCY DEPT VISIT LOW MDM: CPT

## 2023-06-27 RX ADMIN — Medication 3 ML: at 09:39

## 2023-06-29 ENCOUNTER — OFFICE VISIT (OUTPATIENT)
Dept: ORTHOPEDIC SURGERY | Age: 33
End: 2023-06-29

## 2023-06-29 VITALS — BODY MASS INDEX: 17.48 KG/M2 | HEIGHT: 62 IN | RESPIRATION RATE: 14 BRPM | WEIGHT: 95 LBS

## 2023-06-29 DIAGNOSIS — S52.501A CLOSED FRACTURE OF DISTAL END OF RIGHT RADIUS, UNSPECIFIED FRACTURE MORPHOLOGY, INITIAL ENCOUNTER: Primary | ICD-10-CM

## 2023-07-01 ASSESSMENT — ENCOUNTER SYMPTOMS
EYE DISCHARGE: 0
ROS SKIN COMMENTS: NEGATIVE FOR RASH
ABDOMINAL PAIN: 0
SHORTNESS OF BREATH: 0